# Patient Record
Sex: MALE | Race: WHITE | NOT HISPANIC OR LATINO | Employment: OTHER | URBAN - NONMETROPOLITAN AREA
[De-identification: names, ages, dates, MRNs, and addresses within clinical notes are randomized per-mention and may not be internally consistent; named-entity substitution may affect disease eponyms.]

---

## 2018-04-16 ENCOUNTER — EVALUATION (OUTPATIENT)
Dept: PHYSICAL THERAPY | Facility: CLINIC | Age: 48
End: 2018-04-16
Payer: COMMERCIAL

## 2018-04-16 DIAGNOSIS — M48.02 CERVICAL SPINAL STENOSIS: Primary | ICD-10-CM

## 2018-04-16 PROCEDURE — 97140 MANUAL THERAPY 1/> REGIONS: CPT | Performed by: PHYSICAL THERAPIST

## 2018-04-16 PROCEDURE — G8984 CARRY CURRENT STATUS: HCPCS | Performed by: PHYSICAL THERAPIST

## 2018-04-16 PROCEDURE — 97162 PT EVAL MOD COMPLEX 30 MIN: CPT | Performed by: PHYSICAL THERAPIST

## 2018-04-16 PROCEDURE — G8985 CARRY GOAL STATUS: HCPCS | Performed by: PHYSICAL THERAPIST

## 2018-04-16 PROCEDURE — 97014 ELECTRIC STIMULATION THERAPY: CPT | Performed by: PHYSICAL THERAPIST

## 2018-04-16 NOTE — PROGRESS NOTES
PT Evaluation     Today's date: 2018  Patient name: Reyna Ramey  : 1970  MRN: [id number]  Referring provider: Helga Bhatti MD  Dx:   Encounter Diagnosis     ICD-10-CM    1  Cervical spinal stenosis M48 02        Start Time: 1000  Stop Time: 1100  Total time in clinic (min): 60 minutes    Assessment  Impairments: abnormal or restricted ROM, activity intolerance, impaired physical strength and pain with function    Assessment details: Reyna Ramey is a 49y o  year old male presenting to PT with pain, decreased range of motion, decreased strength, and decreased tolerance to activity  Signs and symptoms are consistent with referring diagnosis of cervical stenosis  Significant cervical disc herniation has been found at C5-6 and 6-7  Degenerative changes result in pain limiting activity and ROM  The existing impairments result in difficulty with driving, sleeping, lifting and carrying  Farzad Dubois is limited in functional mobility that prevent him from carrying out his responsibilities at home and in the community  Reyna Ramey would benefit from skilled PT services to address these issues and to maximize function  Home exercise provided and all questions answered  Thank you for the referral     Understanding of Dx/Px/POC: good   Prognosis: fair  Prognosis details: Patient has been recommended to pursue disc replacement and fusion to address MRI findings  Goals    SHORT TERM GOALS (2-4 WEEKS)    Increase cervical spine AROM 25%   Increase upper extremity strength by 10lbs in affected planes  Improve sitting posture  Sitting tolerance increased to >30 minutes  LONG TERM GOALS (DISCHARGE)          Independent with self correcting seated posture  Able to reach overhead without restriction  Lifting tolerance >15lbs  Able to tolerate housework      Plan  Planned modality interventions: cryotherapy, thermotherapy: hydrocollator packs, unattended electrical stimulation and traction  Planned therapy interventions: manual therapy, self care, therapeutic exercise, therapeutic activities and neuromuscular re-education  Frequency: 3x week  Duration in weeks: 4        Subjective Evaluation    History of Present Illness  Mechanism of injury: Patient reports worsening neck pain over a 5 week period  He reports no falls or trauma that may result in injury  Pain is located in his neck and into the L shouler, with paresthesia into the R hand and upper extremity  He anticipates requiring surgery to address this and does not expect to experience relief from Pt  Quality of life: good    Pain  Current pain ratin  At best pain ratin  At worst pain rating: 10  Location: Neck with L radiculopathy  Quality: burning, dull ache and sharp  Relieving factors: change in position, relaxation and support  Aggravating factors: overhead activity and lifting  Progression: worsening      Diagnostic Tests  Abnormal MRI: herniated disc C5-6, 6-7   degenerative changes  Objective     Active Range of Motion   Cervical/Thoracic Spine   Cervical    Left lateral flexion: Neck active lateral bend left: 25%   Right lateral flexion: Neck active lateral bend right: 50%   Left rotation: Neck active rotation left: 75%   Right rotation: Neck active rotation right: 75%     Additional Active Range of Motion Details  Pain at end range with all cervical AROM           Strength/Myotome Testing     Additional Strength Details  Shoulder flexion  R; 53#  L: 28#   Shoulder abduction  R: 49#  L: 25#   Strength  R: 110#  L: 105#      Flowsheet Rows    Flowsheet Row Most Recent Value   PT/OT G-Codes   Current Score  51   Projected Score  66   FOTO information reviewed  Yes   Assessment Type  Evaluation   G code set  Carrying, Moving & Handling Objects   Carrying, Moving and Handling Objects Current Status ()  CJ   Carrying, Moving and Handling Objects Goal Status ()  CJ          Precautions: C5-7 disc herniation    Daily Treatment Diary        Manual  4-16            STM UB and neck in seated 15'                                                                Exercise Diary              UBE nv            cervical retraction  nv            C EXT AROM nv            C ROT AROM             C SB AROM             C FLEX AROM             Long/short arm extension             Pectoral Stretch               Upper Trap, Levator stretch             LAE, LEXUS             Shoulder flexion             Shoulder abduction                                                                                                        Modalities              MHP/IFC 15'

## 2018-04-16 NOTE — LETTER
2018    ABHI AN    Patient: Diana Dawn   YOB: 1970   Date of Visit: 2018     Encounter Diagnosis     ICD-10-CM    1  Cervical spinal stenosis M48 02        Dear Dr Misael Don:    Please review the attached Plan of Care from [facility] recent visit  Please verify that you agree therapy should continue by signing the attached document and sending it back to our office  If you have any questions or concerns, please don't hesitate to call  Sincerely,    Amparo Huitron, PT      Referring Provider:      I certify that I have read the below Plan of Care and certify the need for these services furnished under this plan of treatment while under my care  Catalina Mcgovern  VIA Facsimile: [phone]          PT Evaluation     Today's date: 2018  Patient name: Diana Dawn  : 1970  MRN: [id number]  Referring provider: Bárbara Bhat MD  Dx:   Encounter Diagnosis     ICD-10-CM    1  Cervical spinal stenosis M48 02        Start Time: 1000  Stop Time: 1100  Total time in clinic (min): 60 minutes    Assessment  Impairments: abnormal or restricted ROM, activity intolerance, impaired physical strength and pain with function    Assessment details: Diana Dawn is a 49y o  year old male presenting to PT with pain, decreased range of motion, decreased strength, and decreased tolerance to activity  Signs and symptoms are consistent with referring diagnosis of cervical stenosis  Significant cervical disc herniation has been found at C5-6 and 6-7  Degenerative changes result in pain limiting activity and ROM  The existing impairments result in difficulty with driving, sleeping, lifting and carrying  Sharalyn Ok is limited in functional mobility that prevent him from carrying out his responsibilities at home and in the community  Diana Dawn would benefit from skilled PT services to address these issues and to maximize function    Home exercise provided and all questions answered  Thank you for the referral     Understanding of Dx/Px/POC: good   Prognosis: fair  Prognosis details: Patient has been recommended to pursue disc replacement and fusion to address MRI findings  Goals    SHORT TERM GOALS (2-4 WEEKS)    Increase cervical spine AROM 25%   Increase upper extremity strength by 10lbs in affected planes  Improve sitting posture  Sitting tolerance increased to >30 minutes  LONG TERM GOALS (DISCHARGE)          Independent with self correcting seated posture  Able to reach overhead without restriction  Lifting tolerance >15lbs  Able to tolerate housework  Plan  Planned modality interventions: cryotherapy, thermotherapy: hydrocollator packs, unattended electrical stimulation and traction  Planned therapy interventions: manual therapy, self care, therapeutic exercise, therapeutic activities and neuromuscular re-education  Frequency: 3x week  Duration in weeks: 4        Subjective Evaluation    History of Present Illness  Mechanism of injury: Patient reports worsening neck pain over a 5 week period  He reports no falls or trauma that may result in injury  Pain is located in his neck and into the L shouler, with paresthesia into the R hand and upper extremity  He anticipates requiring surgery to address this and does not expect to experience relief from Pt  Quality of life: good    Pain  Current pain ratin  At best pain ratin  At worst pain rating: 10  Location: Neck with L radiculopathy  Quality: burning, dull ache and sharp  Relieving factors: change in position, relaxation and support  Aggravating factors: overhead activity and lifting  Progression: worsening      Diagnostic Tests  Abnormal MRI: herniated disc C5-6, 6-7   degenerative changes          Objective     Active Range of Motion   Cervical/Thoracic Spine   Cervical    Left lateral flexion: Neck active lateral bend left: 25%   Right lateral flexion: Neck active lateral bend right: 50%   Left rotation: Neck active rotation left: 75%   Right rotation: Neck active rotation right: 75%     Additional Active Range of Motion Details  Pain at end range with all cervical AROM           Strength/Myotome Testing     Additional Strength Details  Shoulder flexion  R; 53#  L: 28#   Shoulder abduction  R: 49#  L: 25#   Strength  R: 110#  L: 105#      Flowsheet Rows    Flowsheet Row Most Recent Value   PT/OT G-Codes   Current Score  51   Projected Score  66   FOTO information reviewed  Yes   Assessment Type  Evaluation   G code set  Carrying, Moving & Handling Objects   Carrying, Moving and Handling Objects Current Status ()  CJ   Carrying, Moving and Handling Objects Goal Status ()  CJ          Precautions: C5-7 disc herniation    Daily Treatment Diary        Manual  4-16            STM UB and neck in seated 15'                                                                Exercise Diary              UBE nv            cervical retraction  nv            C EXT AROM nv            C ROT AROM             C SB AROM             C FLEX AROM             Long/short arm extension             Pectoral Stretch               Upper Trap, Levator stretch             LAE, LEXUS             Shoulder flexion             Shoulder abduction                                                                                                        Modalities              MHP/IFC 15'

## 2018-04-19 ENCOUNTER — OFFICE VISIT (OUTPATIENT)
Dept: PHYSICAL THERAPY | Facility: CLINIC | Age: 48
End: 2018-04-19
Payer: COMMERCIAL

## 2018-04-19 DIAGNOSIS — M48.02 CERVICAL SPINAL STENOSIS: Primary | ICD-10-CM

## 2018-04-19 PROCEDURE — 97110 THERAPEUTIC EXERCISES: CPT | Performed by: PHYSICAL THERAPIST

## 2018-04-19 PROCEDURE — 97014 ELECTRIC STIMULATION THERAPY: CPT | Performed by: PHYSICAL THERAPIST

## 2018-04-19 PROCEDURE — 97535 SELF CARE MNGMENT TRAINING: CPT | Performed by: PHYSICAL THERAPIST

## 2018-04-19 PROCEDURE — 97140 MANUAL THERAPY 1/> REGIONS: CPT | Performed by: PHYSICAL THERAPIST

## 2018-04-19 NOTE — PROGRESS NOTES
Today's date: 2018  Patient name: Chelle Hawk  : 1970  MRN: [id number]  Referring provider: Belkis Nicholson MD  Dx:   Encounter Diagnosis     ICD-10-CM    1  Cervical spinal stenosis M48 02                   Subjective: Tony Jackson reports that his shoulder and upper back felt relief for 2-3 hours following last visit before his pain symptoms returned  He did some chores in his garage during that time and is unsure if that contributed to return of symptoms  He plans to relax after his visit today to determine if rest allows relief to last longer  Objective: See treatment diary below      Assessment: Tolerated treatment well and was able to tolerate addition of AROM  Marcine Grow Patient would benefit from continued PT and reports temporary relief from symptoms following PT  Plan: Progress treatment as tolerated         Precautions: C5-7 disc herniation     Daily Treatment Diary           Manual  4-16  4-19                   STM UB and neck in seated 15'  15'                                                                                                                   Exercise Diary                        UBE nv  3/3                   cervical retraction  nv  5"x15                   C EXT AROM nv                     C ROT AROM    5"x15                   C SB AROM                       C FLEX AROM    5"x15                   Long/short arm extension    l2 x15 ea                   Pectoral Stretch                        Shoulder flexion                       Shoulder abduction                        L UT stretch   30"x3                    L levator stretch    30"x3                                                                                                                                           Modalities                        MHP/IFC 13'  15'

## 2018-04-23 ENCOUNTER — OFFICE VISIT (OUTPATIENT)
Dept: PHYSICAL THERAPY | Facility: CLINIC | Age: 48
End: 2018-04-23
Payer: COMMERCIAL

## 2018-04-23 DIAGNOSIS — M48.02 CERVICAL SPINAL STENOSIS: Primary | ICD-10-CM

## 2018-04-23 PROCEDURE — 97010 HOT OR COLD PACKS THERAPY: CPT | Performed by: PHYSICAL THERAPIST

## 2018-04-23 PROCEDURE — 97014 ELECTRIC STIMULATION THERAPY: CPT | Performed by: PHYSICAL THERAPIST

## 2018-04-23 PROCEDURE — 97110 THERAPEUTIC EXERCISES: CPT | Performed by: PHYSICAL THERAPIST

## 2018-04-23 PROCEDURE — 97140 MANUAL THERAPY 1/> REGIONS: CPT | Performed by: PHYSICAL THERAPIST

## 2018-04-23 NOTE — PROGRESS NOTES
Today's date: 2018  Patient name: Karen Carolina  : 1970  MRN: [id number]  Referring provider: Jeff Mishra MD  Dx:   Encounter Diagnosis     ICD-10-CM    1  Cervical spinal stenosis M48 02                   Subjective: Mohinder Carson reports that his neck has been very painful all weekend, resulting in extreme difficulty sleeping  He experienced approximately 3 hours of relief following last PT session  Objective: See treatment diary below      Assessment: Tolerated treatment fair and was limited by pain at end range for all movements    Patient would benefit from continued PT      Plan: Continue per plan of care        Precautions: C5-7 disc herniation     Daily Treatment Diary           Manual  -16  -  4-23                 STM UB and neck in seated 15'  15'  15'                                                                                                                 Exercise Diary                        UBE nv  3/3  4F/1B                 cervical retraction  nv  1"U10  7"N96                 C EXT AROM nv                     C ROT AROM    5"x15  5"x15                 C SB AROM                       C FLEX AROM    5"x15  5"x15                 Long/short arm extension    l2 x15 ea  l2 x15                 Pectoral Stretch       20"x3                 Shoulder flexion                       Shoulder abduction                        L UT stretch   30"x3  30"x3                  L levator stretch    30"x3                                                                                                                                           Modalities                        MHP/IFC 13'  15'                    MHP -pre tx     10'                  IFC - post tx      15'

## 2018-04-27 ENCOUNTER — OFFICE VISIT (OUTPATIENT)
Dept: PHYSICAL THERAPY | Facility: CLINIC | Age: 48
End: 2018-04-27
Payer: COMMERCIAL

## 2018-04-27 DIAGNOSIS — M48.02 CERVICAL SPINAL STENOSIS: Primary | ICD-10-CM

## 2018-04-27 PROCEDURE — 97014 ELECTRIC STIMULATION THERAPY: CPT | Performed by: PHYSICAL THERAPIST

## 2018-04-27 PROCEDURE — 97010 HOT OR COLD PACKS THERAPY: CPT | Performed by: PHYSICAL THERAPIST

## 2018-04-27 PROCEDURE — 97140 MANUAL THERAPY 1/> REGIONS: CPT | Performed by: PHYSICAL THERAPIST

## 2018-04-27 PROCEDURE — 97110 THERAPEUTIC EXERCISES: CPT | Performed by: PHYSICAL THERAPIST

## 2018-04-27 PROCEDURE — 97535 SELF CARE MNGMENT TRAINING: CPT | Performed by: PHYSICAL THERAPIST

## 2018-04-30 ENCOUNTER — OFFICE VISIT (OUTPATIENT)
Dept: PHYSICAL THERAPY | Facility: CLINIC | Age: 48
End: 2018-04-30
Payer: COMMERCIAL

## 2018-04-30 DIAGNOSIS — M48.02 CERVICAL SPINAL STENOSIS: Primary | ICD-10-CM

## 2018-04-30 PROCEDURE — 97110 THERAPEUTIC EXERCISES: CPT | Performed by: PHYSICAL THERAPIST

## 2018-04-30 PROCEDURE — 97535 SELF CARE MNGMENT TRAINING: CPT | Performed by: PHYSICAL THERAPIST

## 2018-04-30 PROCEDURE — 97014 ELECTRIC STIMULATION THERAPY: CPT | Performed by: PHYSICAL THERAPIST

## 2018-04-30 PROCEDURE — 97140 MANUAL THERAPY 1/> REGIONS: CPT | Performed by: PHYSICAL THERAPIST

## 2018-04-30 PROCEDURE — 97010 HOT OR COLD PACKS THERAPY: CPT | Performed by: PHYSICAL THERAPIST

## 2018-04-30 NOTE — PROGRESS NOTES
Today's date: 2018  Patient name: Narvis Schaumann  : 1970  MRN: [id number]  Referring provider: Pia Bergeron MD  Dx:   Encounter Diagnosis     ICD-10-CM    1  Cervical spinal stenosis M48 02                   Subjective: Dawson Weinberg reports that his neck symptoms persist   No significant changes since [facility]  Objective: See treatment diary below      Assessment: Tolerated treatment fair and was unable to progress program due to continued elevated pain symptoms  He reports relief of pain following STM and scapular mobilization    Patient would benefit from continued PT      Plan: Progress treatment as tolerated  Plan to re-assess at the end of this week to determine overall benefit from PT        Precautions: C5-7 disc herniation     Daily Treatment Diary           Manual  4-16  4-  4-  4  430             STM UB and neck in seated 15'  15'  15'  10'  15                                                                                                               Exercise Diary                        UBE nv  3/3  4F/1B  5F/3B  4/4             cervical retraction  nv  5"x15  5"x15  5"x15  15x5"             C EXT AROM nv                     C ROT AROM    5"x15  5"x15                 C SB AROM                       C FLEX AROM    5"x15  5"x15                 Long/short arm extension    l2 x15 ea  l2 x15  L3 2x10  L5 2x10             Pectoral Stretch       20"x3                 Shoulder flexion                       Shoulder abduction                        L UT stretch   30"x3  30"x3  30"x3  30"x3              L levator stretch    30"x3                                                                                                                                           Modalities                        MHP/IFC 13'  15'                    MHP -pre tx     8'  10'  10'              IFC - post tx      15'  15'  15'

## 2018-05-03 ENCOUNTER — OFFICE VISIT (OUTPATIENT)
Dept: PHYSICAL THERAPY | Facility: CLINIC | Age: 48
End: 2018-05-03
Payer: COMMERCIAL

## 2018-05-03 DIAGNOSIS — M48.02 CERVICAL SPINAL STENOSIS: Primary | ICD-10-CM

## 2018-05-03 PROCEDURE — 97014 ELECTRIC STIMULATION THERAPY: CPT | Performed by: PHYSICAL THERAPIST

## 2018-05-03 PROCEDURE — 97140 MANUAL THERAPY 1/> REGIONS: CPT | Performed by: PHYSICAL THERAPIST

## 2018-05-03 PROCEDURE — 97110 THERAPEUTIC EXERCISES: CPT | Performed by: PHYSICAL THERAPIST

## 2018-05-03 NOTE — PROGRESS NOTES
Today's date: 5/3/2018  Patient name: Chalo Pandey  : 1970  MRN: [id number]  Referring provider: Elizabeth Tompkins MD  Dx:   Encounter Diagnosis     ICD-10-CM    1  Cervical spinal stenosis M48 02                   Subjective: Arianna Jeffers reports that his neck is painful today following several hours of power washing yesterday  Objective: See treatment diary below      Assessment: Tolerated treatment fair and was able to tolerate addition of several UE strengthening exercises today    Patient exhibited good technique with therapeutic exercises and declined additional exercises due to increase in pain towards end of session  Plan: Continue per plan of care        Precautions: C5-7 disc herniation     Daily Treatment Diary           Manual  4-16  4-19  4-23  4-27  4-30  5-3           STM UB and neck in seated 15'  15'  15'  10'  15     15'                                   Exercise Diary                        UBE nv  3/3  4F/1B  5F/3B  4/4  4/4           cervical retraction  nv  5"x15  5"x15  5"x15  15x5"  NV           C EXT AROM nv                     C ROT AROM    5"x15  5"x15                 TB ER/IR            L3 L3         C FLEX AROM    5"x15  5"x15                 Long/short arm extension    l2 x15 ea  l2 x15  L3 2x10  L5 2x10  L5 2x10           Pectoral Stretch       20"x3                 Shoulder flexion                       Shoulder abduction                        L UT stretch   30"x3  30"x3  30"x3  30"x3              L levator stretch    30"x3                    TB biceps           L5 2x10            TB Triceps           L5 2x10            TB UT pull           L5 2x10                                                           Modalities                        MHP/IFC 13'  15'        15            MHP -pre tx     10'  10'  10'              IFC - post tx      15'  15'  15'

## 2018-05-04 ENCOUNTER — OFFICE VISIT (OUTPATIENT)
Dept: PHYSICAL THERAPY | Facility: CLINIC | Age: 48
End: 2018-05-04
Payer: COMMERCIAL

## 2018-05-04 DIAGNOSIS — M48.02 CERVICAL SPINAL STENOSIS: Primary | ICD-10-CM

## 2018-05-04 PROCEDURE — 97140 MANUAL THERAPY 1/> REGIONS: CPT | Performed by: PHYSICAL THERAPIST

## 2018-05-04 PROCEDURE — 97110 THERAPEUTIC EXERCISES: CPT | Performed by: PHYSICAL THERAPIST

## 2018-05-04 PROCEDURE — 97014 ELECTRIC STIMULATION THERAPY: CPT | Performed by: PHYSICAL THERAPIST

## 2018-05-04 NOTE — PROGRESS NOTES
Today's date: 2018  Patient name: Reyna Ramey  : 1970  MRN: [id number]  Referring provider: Helga Bhatti MD  Dx:   Encounter Diagnosis     ICD-10-CM    1  Cervical spinal stenosis M48 02                   Subjective: Farzad Dubois reports that his neck is sore today  He tried to "take it easy" yesterday, but still woke with elevated pain levels  Objective: See treatment diary below      Assessment: Tolerated treatment fair and was able to perform full program but was unable to tolerate any progression today    Patient continues to experience short term relief  following PT  His only lasting improvement has been decreased paresthesias in his L hand  Plan: Continue per plan of care  plan RE to progress patient to injection treatment        Precautions: C5-7 disc herniation     Daily Treatment Diary           Manual  4-16  4-19  4-23  4-27  4-30  5-3  5-4   STM UB and neck in seated 15'  15'  15'  10'  15     15  15'                     Exercise Diary                  UBE nv  3/3  4F/1B  5F/3B  4/4  4/4  4/4   cervical retraction  nv  5"x15  5"x15  5"x15  15x5"  NV     C EXT AROM nv               C ROT AROM    5"x15  5"x15           TB ER/IR            L3 NV  L3   C FLEX AROM    5"x15  5"x15           Long/short arm extension    l2 x15 ea  l2 x15  L3 2x10  L5 2x10  L5 2x10  L5 2x10   Pectoral Stretch       20"x3        30"x3   Shoulder flexion                 Shoulder abduction                  L UT stretch   30"x3  30"x3  30"x3  30"x3    30# x3    L levator stretch    30"x3              TB biceps           L5 2x10  L5 2x10    TB Triceps           L5 2x10  L5 2x10    TB UT pull           L5 2x10  L5 2x10                                       Modalities                  MHP/IFC '  15'        15'  15'    MHP -pre tx     10'  10'  10'       [facility] - post tx      15'  15'  15'

## 2018-05-08 ENCOUNTER — OFFICE VISIT (OUTPATIENT)
Dept: PHYSICAL THERAPY | Facility: CLINIC | Age: 48
End: 2018-05-08
Payer: COMMERCIAL

## 2018-05-08 DIAGNOSIS — M48.02 CERVICAL SPINAL STENOSIS: Primary | ICD-10-CM

## 2018-05-08 PROCEDURE — G8985 CARRY GOAL STATUS: HCPCS | Performed by: PHYSICAL THERAPIST

## 2018-05-08 PROCEDURE — 97014 ELECTRIC STIMULATION THERAPY: CPT

## 2018-05-08 PROCEDURE — 97110 THERAPEUTIC EXERCISES: CPT

## 2018-05-08 PROCEDURE — 97140 MANUAL THERAPY 1/> REGIONS: CPT

## 2018-05-08 PROCEDURE — G8984 CARRY CURRENT STATUS: HCPCS | Performed by: PHYSICAL THERAPIST

## 2018-05-08 NOTE — PROGRESS NOTES
PT Re-Evaluation     Today's date: 2018  Patient name: Che Larose  : 1970  MRN: [id number]  Referring provider: Micheline Espinoza MD  Dx:   Encounter Diagnosis     ICD-10-CM    1  Cervical spinal stenosis M48 02      Abigail Rodriguez will be discharged from outpatient physical therapy care due to expiration of plan of care  No objective measures updated, Abigail Rodriguez is not present at time of discharge  Assessment  Impairments: abnormal or restricted ROM, activity intolerance, impaired physical strength and pain with function    Assessment details: Che Larose is a 49y o  year old male presenting to PT with pain, decreased range of motion, decreased strength, and decreased tolerance to activity  Signs and symptoms are consistent with referring diagnosis of cervical stenosis  Significant cervical disc herniation has been found at C5-6 and 6-7  Degenerative changes result in pain limiting activity and ROM  At this time Abigail Rodriguez has not been responding to isolated physical therapy intervention  He would benefit from integrated care including additional imaging, injections if deemed appropriate by his physicians  Understanding of Dx/Px/POC: good   Prognosis: fair  Prognosis details: Patient has been recommended to pursue disc replacement and fusion to address MRI findings  Goals    SHORT TERM GOALS (2-4 WEEKS)    Increase cervical spine AROM 25% - partially met  Increase upper extremity strength by 10lbs in affected planes - met  Improve sitting posture - met  Sitting tolerance increased to >30 minutes  - met          LONG TERM GOALS (DISCHARGE)          Independent with self correcting seated posture - met  Able to reach overhead without restriction - partially met  Lifting tolerance >15lbs  - not met  Able to tolerate housework       Plan  Planned modality interventions: cryotherapy, thermotherapy: hydrocollator packs, unattended electrical stimulation and traction  Planned therapy interventions: manual therapy, self care, therapeutic exercise, therapeutic activities and neuromuscular re-education  Frequency: 3x week  Duration in weeks: 4        Subjective Evaluation    History of Present Illness  Norman Dunne has been compliant with attending PT and home exercise program since initial eval and reports <20%% improvement since start of care  His symptoms improve for 2-3 hours following PT but then return  Ning Lexis reports and demonstrates minimal change in function  since initial evalRalph continues with above listed impairments and would benefit from additional skilled PT to address these deficits to return to prior level of function  Pain  Current pain ratin  At best pain ratin  At worst pain ratin  Location: Neck with L radiculopathy  Quality: burning, dull ache and sharp  Relieving factors: change in position, relaxation and support  Aggravating factors: overhead activity and lifting  Progression: worsening      Diagnostic Tests  Abnormal MRI: herniated disc C5-6, 6-7   degenerative changes  Objective     Active Range of Motion   Cervical/Thoracic Spine   Cervical    Left lateral flexion: Neck active lateral bend left: <50% right: 50-75%  Right lateral flexion: Neck active lateral bend right: 50%   Left rotation: Neck active rotation left: 75%   Right rotation: Neck active rotation right: 75%     Additional Active Range of Motion Details  Pain at end range with all cervical AROM  Strength/Myotome Testing     Additional Strength Details  Shoulder flexion  R; 53#  L: 42#   Shoulder abduction  R: 49#  L: 45#   Strength  R: 110#  L: 105#                    Daily Note     Today's date: 2018  Patient name: Norman Dunne  : 1970  MRN: [id number]  Referring provider: Nohemy Landon MD  Dx:   Encounter Diagnosis     ICD-10-CM    1  Cervical spinal stenosis M48 02                   Subjective: Patient reports that his pain is still the same   He feels good when he leaves therapy for about two hours and then his pain starts increasing again  He will receive injections after 10 therapy visits  Objective: See treatment diary below  Incorporated TB IR/ER today  Assessment: Tolerated treatment fair  Patient had no change in radicular symptoms today  Patient will benefit from further pain management and testing  Plan: Continue per plan of care       Precautions: C5-7 disc herniation     Daily Treatment Diary           Manual  5-8  4-19  4-23  4-27  4-30  5-3  5-4   STM UB and neck in seated 10'  15'  15'  10'  15     15'  15'                     Exercise Diary                  UBE 4/4  3/3  4F/1B  5F/3B  4/4  4/4  4/4   cervical retraction    5"x15  5"x15  5"x15  15x5"  NV     C EXT AROM                C ROT AROM   5"x15  5"x15           TB ER/IR L3 2x10          L3 NV  L3   C FLEX AROM   5"x15  5"x15           Long/short arm extension L5 2x10  l2 x15 ea  l2 x15  L3 2x10  L5 2x10  L5 2x10  L5 2x10   Pectoral Stretch      20"x3        30"x3   Shoulder flexion                Shoulder abduction                 L UT stretch  30"x3  30"x3  30"x3  30"x3    30# x3    L levator stretch  30"x3              TB biceps L 5 2x10         L5 2x10  L5 2x10    TB Triceps L 5 2x10         L5 2x10  L5 2x10    TB UT pull L 5 2x10         L5 2x10  L5 2x10                                     Modalities                 MHP/IFC 13'  15'        15'  15'    MHP -pre tx    10'  10'  10'       [facility] - post tx     15'  15'  15'

## 2018-05-08 NOTE — LETTER
May 8, 2018    JUVEMIKNANETTE    Patient: Priscilla Valentine   YOB: 1970   Date of Visit: 2018     Encounter Diagnosis     ICD-10-CM    1  Cervical spinal stenosis M48 02        Dear Dr Cynthia Felipe:    Please review the attached Plan of Care from [facility] recent visit  Please verify that you agree therapy should continue by signing the attached document and sending it back to our office  If you have any questions or concerns, please don't hesitate to call  Sincerely,    Monica Kiser, PT      Referring Provider:      I certify that I have read the below Plan of Care and certify the need for these services furnished under this plan of treatment while under my care  Angie Trinh  VIA Facsimile: [phone]          PT Re-Evaluation     Today's date: 2018  Patient name: Priscilla Valentine  : 1970  MRN: [id number]  Referring provider: Jens Pierson MD  Dx:   Encounter Diagnosis     ICD-10-CM    1  Cervical spinal stenosis M48 02                   Assessment  Impairments: abnormal or restricted ROM, activity intolerance, impaired physical strength and pain with function    Assessment details: Priscilla Valentine is a 49y o  year old male presenting to PT with pain, decreased range of motion, decreased strength, and decreased tolerance to activity  Signs and symptoms are consistent with referring diagnosis of cervical stenosis  Significant cervical disc herniation has been found at C5-6 and 6-7  Degenerative changes result in pain limiting activity and ROM  At this time Lino Constantino has not been responding to isolated physical therapy intervention  He would benefit from integrated care including additional imaging, injections if deemed appropriate by his physicians  Understanding of Dx/Px/POC: good   Prognosis: fair  Prognosis details: Patient has been recommended to pursue disc replacement and fusion to address MRI findings       Goals    SHORT TERM GOALS (2-4 WEEKS)    Increase cervical spine AROM 25% - partially met  Increase upper extremity strength by 10lbs in affected planes - met  Improve sitting posture - met  Sitting tolerance increased to >30 minutes  - met          LONG TERM GOALS (DISCHARGE)          Independent with self correcting seated posture - met  Able to reach overhead without restriction - partially met  Lifting tolerance >15lbs  - not met  Able to tolerate housework  Plan  Planned modality interventions: cryotherapy, thermotherapy: hydrocollator packs, unattended electrical stimulation and traction  Planned therapy interventions: manual therapy, self care, therapeutic exercise, therapeutic activities and neuromuscular re-education  Frequency: 3x week  Duration in weeks: 4        Subjective Evaluation    History of Present Illness  Norman Dunne has been compliant with attending PT and home exercise program since initial eval and reports <20%% improvement since start of care  His symptoms improve for 2-3 hours following PT but then return  Ninglevi Pires reports and demonstrates minimal change in function  since initial evalRalph continues with above listed impairments and would benefit from additional skilled PT to address these deficits to return to prior level of function  Pain  Current pain ratin  At best pain ratin  At worst pain ratin  Location: Neck with L radiculopathy  Quality: burning, dull ache and sharp  Relieving factors: change in position, relaxation and support  Aggravating factors: overhead activity and lifting  Progression: worsening      Diagnostic Tests  Abnormal MRI: herniated disc C5-6, 6-7   degenerative changes          Objective     Active Range of Motion   Cervical/Thoracic Spine   Cervical    Left lateral flexion: Neck active lateral bend left: <50% right: 50-75%  Right lateral flexion: Neck active lateral bend right: 50%   Left rotation: Neck active rotation left: 75%   Right rotation: Neck active rotation right: 75%     Additional Active Range of Motion Details  Pain at end range with all cervical AROM  Strength/Myotome Testing     Additional Strength Details  Shoulder flexion  R; 53#  L: 42#   Shoulder abduction  R: 49#  L: 45#   Strength  R: 110#  L: 105#                    Daily Note     Today's date: 2018  Patient name: Mele Ortiz  : 1970  MRN: [id number]  Referring provider: Radha Hooper MD  Dx:   Encounter Diagnosis     ICD-10-CM    1  Cervical spinal stenosis M48 02                   Subjective: Patient reports that his pain is still the same  He feels good when he leaves therapy for about two hours and then his pain starts increasing again  He will receive injections after 10 therapy visits  Objective: See treatment diary below  Incorporated TB IR/ER today  Assessment: Tolerated treatment fair  Patient had no change in radicular symptoms today  Patient will benefit from further pain management and testing  Plan: Continue per plan of care       Precautions: C5-7 disc herniation     Daily Treatment Diary           Manual  5-8  4-19  4-23  4-27  4-30  5-3  5-4   STM UB and neck in seated 10'  15'  15'  10'  15     15'  15'                     Exercise Diary                  UBE 4/4  3/3  4F/1B  5F/3B  4/4  4/4  4/4   cervical retraction    5"x15  5"x15  5"x15  15x5"  NV     C EXT AROM                C ROT AROM   5"x15  5"x15           TB ER/IR L3 2x10          L3 NV  L3   C FLEX AROM   5"x15  5"x15           Long/short arm extension L5 2x10  l2 x15 ea  l2 x15  L3 2x10  L5 2x10  L5 2x10  L5 2x10   Pectoral Stretch      20"x3        30"x3   Shoulder flexion                Shoulder abduction                 L UT stretch  30"x3  30"x3  30"x3  30"x3    30# x3    L levator stretch  30"x3              TB biceps L 5 2x10         L5 2x10  L5 2x10    TB Triceps L 5 2x10         L5 2x10  L5 2x10    TB UT pull L 5 2x10         L5 2x10  L5 2x10                                     Modalities                 MHP/IFC 13'  15'        15'  15'    MHP -pre tx    10'  10'  10'       [facility] - post tx     15'  15'  15'

## 2018-05-11 ENCOUNTER — APPOINTMENT (OUTPATIENT)
Dept: PHYSICAL THERAPY | Facility: CLINIC | Age: 48
End: 2018-05-11
Payer: COMMERCIAL

## 2024-07-30 ENCOUNTER — APPOINTMENT (EMERGENCY)
Dept: RADIOLOGY | Facility: HOSPITAL | Age: 54
End: 2024-07-30
Payer: COMMERCIAL

## 2024-07-30 ENCOUNTER — HOSPITAL ENCOUNTER (EMERGENCY)
Facility: HOSPITAL | Age: 54
Discharge: HOME | End: 2024-07-30
Attending: EMERGENCY MEDICINE
Payer: COMMERCIAL

## 2024-07-30 ENCOUNTER — APPOINTMENT (EMERGENCY)
Dept: CT IMAGING | Facility: HOSPITAL | Age: 54
End: 2024-07-30
Payer: COMMERCIAL

## 2024-07-30 VITALS
DIASTOLIC BLOOD PRESSURE: 56 MMHG | SYSTOLIC BLOOD PRESSURE: 114 MMHG | HEART RATE: 58 BPM | WEIGHT: 224.21 LBS | OXYGEN SATURATION: 97 % | RESPIRATION RATE: 20 BRPM | TEMPERATURE: 97.4 F

## 2024-07-30 DIAGNOSIS — I95.1 ORTHOSTATIC SYNCOPE: Primary | ICD-10-CM

## 2024-07-30 DIAGNOSIS — R00.1 JUNCTIONAL BRADYCARDIA: ICD-10-CM

## 2024-07-30 DIAGNOSIS — E87.1 HYPONATREMIA: ICD-10-CM

## 2024-07-30 LAB
ANION GAP SERPL CALCULATED.3IONS-SCNC: 12 MMOL/L (ref 4–13)
BASE EX.OXY STD BLDV CALC-SCNC: 59.3 % (ref 60–80)
BASE EXCESS BLDV CALC-SCNC: -7.8 MMOL/L
BASOPHILS # BLD AUTO: 0.04 THOUSANDS/ÂΜL (ref 0–0.1)
BASOPHILS NFR BLD AUTO: 1 % (ref 0–1)
BILIRUB UR QL STRIP: NEGATIVE
BNP SERPL-MCNC: 93 PG/ML (ref 0–100)
BUN SERPL-MCNC: 14 MG/DL (ref 5–25)
CALCIUM SERPL-MCNC: 9.2 MG/DL (ref 8.4–10.2)
CARDIAC TROPONIN I PNL SERPL HS: 4 NG/L
CHLORIDE SERPL-SCNC: 99 MMOL/L (ref 96–108)
CK SERPL-CCNC: 91 U/L (ref 39–308)
CLARITY UR: CLEAR
CO2 SERPL-SCNC: 20 MMOL/L (ref 21–32)
COLOR UR: NORMAL
CREAT SERPL-MCNC: 1.11 MG/DL (ref 0.6–1.3)
EOSINOPHIL # BLD AUTO: 0.52 THOUSAND/ÂΜL (ref 0–0.61)
EOSINOPHIL NFR BLD AUTO: 6 % (ref 0–6)
ERYTHROCYTE [DISTWIDTH] IN BLOOD BY AUTOMATED COUNT: 12.9 % (ref 11.6–15.1)
ETHANOL SERPL-MCNC: 133 MG/DL
GFR SERPL CREATININE-BSD FRML MDRD: 74 ML/MIN/1.73SQ M
GLUCOSE SERPL-MCNC: 113 MG/DL (ref 65–140)
GLUCOSE UR STRIP-MCNC: NEGATIVE MG/DL
HCO3 BLDV-SCNC: 17.9 MMOL/L (ref 24–30)
HCT VFR BLD AUTO: 30.2 % (ref 36.5–49.3)
HGB BLD-MCNC: 9.6 G/DL (ref 12–17)
HGB UR QL STRIP.AUTO: NEGATIVE
IMM GRANULOCYTES # BLD AUTO: 0.03 THOUSAND/UL (ref 0–0.2)
IMM GRANULOCYTES NFR BLD AUTO: 0 % (ref 0–2)
INR PPP: 1.11 (ref 0.84–1.19)
KETONES UR STRIP-MCNC: NEGATIVE MG/DL
LACTATE SERPL-SCNC: 2.1 MMOL/L (ref 0.5–2)
LEUKOCYTE ESTERASE UR QL STRIP: NEGATIVE
LYMPHOCYTES # BLD AUTO: 2.25 THOUSANDS/ÂΜL (ref 0.6–4.47)
LYMPHOCYTES NFR BLD AUTO: 26 % (ref 14–44)
MCH RBC QN AUTO: 30.2 PG (ref 26.8–34.3)
MCHC RBC AUTO-ENTMCNC: 31.8 G/DL (ref 31.4–37.4)
MCV RBC AUTO: 95 FL (ref 82–98)
MONOCYTES # BLD AUTO: 0.66 THOUSAND/ÂΜL (ref 0.17–1.22)
MONOCYTES NFR BLD AUTO: 8 % (ref 4–12)
NEUTROPHILS # BLD AUTO: 5.17 THOUSANDS/ÂΜL (ref 1.85–7.62)
NEUTS SEG NFR BLD AUTO: 59 % (ref 43–75)
NITRITE UR QL STRIP: NEGATIVE
NRBC BLD AUTO-RTO: 0 /100 WBCS
O2 CT BLDV-SCNC: 9 ML/DL
PCO2 BLDV: 36.7 MM HG (ref 42–50)
PH BLDV: 7.3 [PH] (ref 7.3–7.4)
PH UR STRIP.AUTO: 5.5 [PH]
PLATELET # BLD AUTO: 337 THOUSANDS/UL (ref 149–390)
PMV BLD AUTO: 8.9 FL (ref 8.9–12.7)
PO2 BLDV: 36.6 MM HG (ref 35–45)
POTASSIUM SERPL-SCNC: 3.9 MMOL/L (ref 3.5–5.3)
PROT UR STRIP-MCNC: NEGATIVE MG/DL
PROTHROMBIN TIME: 14.2 SECONDS (ref 11.6–14.5)
RBC # BLD AUTO: 3.18 MILLION/UL (ref 3.88–5.62)
SODIUM SERPL-SCNC: 131 MMOL/L (ref 135–147)
SP GR UR STRIP.AUTO: 1.01 (ref 1–1.03)
T4 FREE SERPL-MCNC: 0.67 NG/DL (ref 0.61–1.12)
TSH SERPL DL<=0.05 MIU/L-ACNC: 3.36 UIU/ML (ref 0.45–4.5)
UROBILINOGEN UR STRIP-ACNC: <2 MG/DL
WBC # BLD AUTO: 8.67 THOUSAND/UL (ref 4.31–10.16)

## 2024-07-30 PROCEDURE — 84484 ASSAY OF TROPONIN QUANT: CPT | Performed by: EMERGENCY MEDICINE

## 2024-07-30 PROCEDURE — 93308 TTE F-UP OR LMTD: CPT | Performed by: EMERGENCY MEDICINE

## 2024-07-30 PROCEDURE — 82550 ASSAY OF CK (CPK): CPT | Performed by: EMERGENCY MEDICINE

## 2024-07-30 PROCEDURE — 70450 CT HEAD/BRAIN W/O DYE: CPT

## 2024-07-30 PROCEDURE — 93005 ELECTROCARDIOGRAM TRACING: CPT

## 2024-07-30 PROCEDURE — 96375 TX/PRO/DX INJ NEW DRUG ADDON: CPT

## 2024-07-30 PROCEDURE — 84443 ASSAY THYROID STIM HORMONE: CPT | Performed by: EMERGENCY MEDICINE

## 2024-07-30 PROCEDURE — 71045 X-RAY EXAM CHEST 1 VIEW: CPT

## 2024-07-30 PROCEDURE — 74177 CT ABD & PELVIS W/CONTRAST: CPT

## 2024-07-30 PROCEDURE — 99285 EMERGENCY DEPT VISIT HI MDM: CPT

## 2024-07-30 PROCEDURE — 81003 URINALYSIS AUTO W/O SCOPE: CPT | Performed by: EMERGENCY MEDICINE

## 2024-07-30 PROCEDURE — 83880 ASSAY OF NATRIURETIC PEPTIDE: CPT | Performed by: EMERGENCY MEDICINE

## 2024-07-30 PROCEDURE — 96361 HYDRATE IV INFUSION ADD-ON: CPT

## 2024-07-30 PROCEDURE — 82077 ASSAY SPEC XCP UR&BREATH IA: CPT | Performed by: EMERGENCY MEDICINE

## 2024-07-30 PROCEDURE — 36415 COLL VENOUS BLD VENIPUNCTURE: CPT | Performed by: EMERGENCY MEDICINE

## 2024-07-30 PROCEDURE — 71260 CT THORAX DX C+: CPT

## 2024-07-30 PROCEDURE — 80048 BASIC METABOLIC PNL TOTAL CA: CPT | Performed by: EMERGENCY MEDICINE

## 2024-07-30 PROCEDURE — 99285 EMERGENCY DEPT VISIT HI MDM: CPT | Performed by: EMERGENCY MEDICINE

## 2024-07-30 PROCEDURE — 76705 ECHO EXAM OF ABDOMEN: CPT | Performed by: EMERGENCY MEDICINE

## 2024-07-30 PROCEDURE — 83605 ASSAY OF LACTIC ACID: CPT | Performed by: EMERGENCY MEDICINE

## 2024-07-30 PROCEDURE — 84439 ASSAY OF FREE THYROXINE: CPT | Performed by: EMERGENCY MEDICINE

## 2024-07-30 PROCEDURE — 85025 COMPLETE CBC W/AUTO DIFF WBC: CPT | Performed by: EMERGENCY MEDICINE

## 2024-07-30 PROCEDURE — 96365 THER/PROPH/DIAG IV INF INIT: CPT

## 2024-07-30 PROCEDURE — 85610 PROTHROMBIN TIME: CPT | Performed by: EMERGENCY MEDICINE

## 2024-07-30 PROCEDURE — 72125 CT NECK SPINE W/O DYE: CPT

## 2024-07-30 PROCEDURE — 82805 BLOOD GASES W/O2 SATURATION: CPT | Performed by: EMERGENCY MEDICINE

## 2024-07-30 RX ORDER — SODIUM CHLORIDE, SODIUM GLUCONATE, SODIUM ACETATE, POTASSIUM CHLORIDE, MAGNESIUM CHLORIDE, SODIUM PHOSPHATE, DIBASIC, AND POTASSIUM PHOSPHATE .53; .5; .37; .037; .03; .012; .00082 G/100ML; G/100ML; G/100ML; G/100ML; G/100ML; G/100ML; G/100ML
1000 INJECTION, SOLUTION INTRAVENOUS ONCE
Status: COMPLETED | OUTPATIENT
Start: 2024-07-30 | End: 2024-07-30

## 2024-07-30 RX ORDER — ATROPINE SULFATE 0.1 MG/ML
0.5 INJECTION INTRAVENOUS ONCE
Status: COMPLETED | OUTPATIENT
Start: 2024-07-30 | End: 2024-07-30

## 2024-07-30 RX ADMIN — IOHEXOL 100 ML: 350 INJECTION, SOLUTION INTRAVENOUS at 12:11

## 2024-07-30 RX ADMIN — SODIUM CHLORIDE 1000 ML: 0.9 INJECTION, SOLUTION INTRAVENOUS at 11:27

## 2024-07-30 RX ADMIN — SODIUM CHLORIDE, SODIUM GLUCONATE, SODIUM ACETATE, POTASSIUM CHLORIDE, MAGNESIUM CHLORIDE, SODIUM PHOSPHATE, DIBASIC, AND POTASSIUM PHOSPHATE 1000 ML: .53; .5; .37; .037; .03; .012; .00082 INJECTION, SOLUTION INTRAVENOUS at 12:43

## 2024-07-30 RX ADMIN — ATROPINE SULFATE 0.5 MG: 0.1 INJECTION INTRAVENOUS at 11:35

## 2024-07-30 NOTE — ED PROVIDER NOTES
Emergency Department Trauma Note  Nba Jimenez 54 y.o. male MRN: [id number]  Unit/Bed#: RM03/RM03 Encounter: [id number]      Trauma Alert: Trauma Acuity: Trauma Evaluation  Model of Arrival: Mode of Arrival: BLS via Trauma Squad Name and Number: Komal  Trauma Team: Current Providers  Attending Provider: Eduardo Renee DO  ED Technician: Slava Delatorre  ED Technician: Barbara Dhillon  Registered Nurse: Vidya Page RN  Consultants:     None      History of Present Illness     Chief Complaint:   Chief Complaint   Patient presents with    Fall     Patient brought by EMS for fall after standing witnessed after seizure at bar. No prior history of seizures. Positive head strike. Positive LOC x2 minutes. On aspirin. L hip replacement 3 weeks ago, right hip replacement 6 weeks ago. Patient denies pain/complaints.      HPI:  Nba Jimenez is a 54 y.o. male who presents via EMS after witnessed syncopal episode; patient was at a bar sitting on a stool.  Upon standing upright, he syncopized and fell to the ground.  Did strike his head.  This was witnessed by his sister who is a radiology technician in this facility.  She reported seizure-like activity of less than 1 minute with approximately 2-3-minute episode of confusion afterwards prior to return to normal mental status.  Patient himself really has no complaints upon arrival to the emergency department and is not especially concerned about the episode that he had today; he in fact has fairly frequent episodes of lightheadedness upon standing.  Only episode that resulted in syncope was in October 2023.  He had a quite extensive investigation for the etiology of his orthostatic lightheadedness with no specific etiology identified.  He had been drinking several alcoholic beverages today prior to the syncopal episode but has noted otherwise really had no complaints.  No recent illnesses or changes in health.  No recent changes in medications. Underwent L THR 8  July 2024 with no issues thus far in recovery. Takes baby aspirin daily.  He has not seen any melena or hematochezia over past several weeks.  No episodes of hematemesis.  States he aggressively tries to maintain adequate hydration and has no reason to think that he is dehydrated.    Trauma activation given syncopal episode/traumatic mechanism with hypotension on arrival.  It was notable immediately upon arrival the patient was significantly bradycardic although without the neurologic impairment that would be expected to accompany neurogenic shock. FAST exam demonstrated no cardiac or abdominal/pelvic etiology for hypotension.  Fluid bolus was administered as I did have much stronger suspicion of a cardiogenic or distributive cause for the hypotension.  0.5 mg atropine was administered with a slow/gradual increase in the heart rate of approximately 10 bpm to around 55-60 bpm.  Contemporaneous to this, BP did increase from initial hypotensive values to consistent MAP greater than 65, although this occurred in conjunction with IV fluid administration.  Briefly during this interval, patient developed a junctional rhythm with retrograde conduction at approximately 60 bpm which was faster than the sinus bradycardia he had been experiencing previously.  Subsequent to this, he transitioned to a sinus rhythm which he maintained consistently thereafter.  Patient maintained a consistently normal mental status throughout this entire interval and had no sx during any point of this. Plan was to obtain CT head/C-spine/chest on pelvis for any traumatic injuries and to perform a broad diagnostic workup concurrently for any etiologies that might account for the episode.  Assuming this did not show any traumatic findings, I had suspicion the patient might require pacer given the bradycardic episode.    CT imaging showed no traumatic findings.  He had a sustained and generally increased blood pressure compared to his baseline.  Broad  investigation of prior outpatient notes showed a baseline BP that was at times fairly low (90s/60s) at various times and largely consistent with a blood pressure as he demonstrated here.  Mild electrolyte disturbances were identified but nothing sufficient to account for the episode today. He had no further arrhythmias at any point and only brief episodes of sinus bradycardia to about 50 bpm not accompanied by hypotension.   I suspect ultimately that the symptoms today were indeed orthostatic in nature.    He very much preferred not to be admitted to the hospital given the episodes he had previously been of a very similar character to what he had today.  Stated that he would call his primary physician tomorrow for further investigation and that he has an appointment with his cardiologist in the near future.    At this point, the only finding that remained concerning was the brief episode of junctional rhythm.  Reviewed this at length with patient and his sister.  Offered option of an hospital admission.  Again, he very much prefered not to be admitted.  As he had demonstrated hemodynamic stability for a prolonged length of time and had been asymptomatic the entire time that he was in the ED no matter his vital signs, I did feel that discharge is reasonable.  He stated that he would contact his primary physician the following day and that he has an upcoming appoint with his cardiologist in the next several weeks.  Again, given his stability, all of this was reasonable.  He was discharged with very close return instructions regarding any further episodes of syncope or other concerning symptoms including anything that suggested ACS.  All questions were answered to satisfaction of patient and his sister prior to discharge.    Mechanism:Details of Incident: Patient fell after having 5 beers at bar. witnessed seizure like activity. no history. positive loc x 2 minutes. positive heads trike. on thinners Injury Date:  07/30/24 Injury Time: 1050 Injury Occurence Location - Specify County: [facility]      History provided by:  Medical records, patient, EMS personnel and relative (Patient's sister accompanies him)  Fall  Associated symptoms: no abdominal pain, no chest pain, no headaches, no nausea, no seizures and no vomiting      Review of Systems   Constitutional:  Negative for chills, diaphoresis, fatigue and fever.   Respiratory: Negative.  Negative for cough, chest tightness and shortness of breath.    Cardiovascular: Negative.  Negative for chest pain, palpitations and leg swelling.   Gastrointestinal: Negative.  Negative for abdominal pain, blood in stool, nausea and vomiting.   Genitourinary: Negative.    Musculoskeletal: Negative.    Skin: Negative.    Neurological:  Positive for syncope and light-headedness. Negative for dizziness, tremors, seizures, facial asymmetry, speech difficulty, weakness, numbness and headaches.   Hematological: Negative.        Historical Information     Immunizations:   There is no immunization history on file for this patient.    Past Medical History:   Diagnosis Date    Hypertension      History reviewed. No pertinent family history.  Past Surgical History:   Procedure Laterality Date    JOINT REPLACEMENT          E-Cigarette/Vaping     E-Cigarette/Vaping Substances       Family History: non-contributory    Meds/Allergies   None       No Known Allergies    PHYSICAL EXAM    Objective   Vitals:   First set: Temperature: (!) 97 °F (36.1 °C) (07/30/24 1105)  Pulse: (!) 45 (07/30/24 1105)  Respirations: 16 (07/30/24 1105)  Blood Pressure: 92/55 (07/30/24 1105)  SpO2: 97 % (07/30/24 1105)    Primary survey completed at time of initial evaluation:  Airway patent. Patient phonating normally with no stridor/dysphonia.  Lung sounds are present bilaterally with no wheeze/crackles/rhonchi.  Distal pulses are present in all extremities: 2+ radial/DP/PT. There is no visible external hemorrhage.  GCS 15. LUJAN  x4 with equal tone. Sensation intact in all extremities.  Exposure completed to assess for other occult injuries.    Secondary survey was completed for all other injuries.  Notable findings are reviewed in the full examination section.      Secondary Survey:   Physical Exam  Vitals and nursing note reviewed.   Constitutional:       General: He is awake. He is not in acute distress.     Appearance: Normal appearance. He is well-developed.   HENT:      Head: Normocephalic and atraumatic.      Right Ear: Hearing and external ear normal.      Left Ear: Hearing and external ear normal.   Neck:      Trachea: Trachea and phonation normal.      Comments: No posterior midline tenderness to palpation or step-off  Cardiovascular:      Rate and Rhythm: Regular rhythm. Bradycardia present.      Pulses:           Radial pulses are 2+ on the right side and 2+ on the left side.        Dorsalis pedis pulses are 2+ on the right side and 2+ on the left side.        Posterior tibial pulses are 2+ on the right side and 2+ on the left side.      Heart sounds: Normal heart sounds, S1 normal and S2 normal. No murmur heard.     No friction rub. No gallop.   Pulmonary:      Effort: Pulmonary effort is normal. No respiratory distress.      Breath sounds: Normal breath sounds. No stridor. No decreased breath sounds, wheezing, rhonchi or rales.   Abdominal:      General: There is no distension.      Palpations: There is no mass.      Tenderness: There is no abdominal tenderness. There is no guarding or rebound.   Musculoskeletal:      Comments: No posterior midline T/L spine ttp or stepoff   Skin:     General: Skin is warm and dry.   Neurological:      Mental Status: He is alert and oriented to person, place, and time.      GCS: GCS eye subscore is 4. GCS verbal subscore is 5. GCS motor subscore is 6.      Cranial Nerves: No cranial nerve deficit.      Sensory: No sensory deficit.      Motor: No abnormal muscle tone.      Comments: PERRLA;  EOMI. Sensation intact to light touch over face in V1-V3 distribution bilaterally. Facial expressions symmetric. Tongue/uvula midline. Shoulder shrug equal bilaterally. Strength 5/5 in UE/LE bilaterally. Sensation intact to light touch in UE/LE bilaterally.         Cervical spine cleared by clinical criteria? No (imaging required)      Invasive Devices       None                   Lab Results:   Results Reviewed       Procedure Component Value Units Date/Time    TSH [[id number]]  (Normal) Collected: 07/30/24 1142    Lab Status: Final result Specimen: Blood from Arm, Right Updated: 07/30/24 1354     TSH 3RD GENERATON 3.363 uIU/mL     T4, free [[id number]] Collected: 07/30/24 1142    Lab Status: In process Specimen: Blood Updated: 07/30/24 1321    UA w Reflex to Microscopic w Reflex to Culture [[id number]] Collected: 07/30/24 1311    Lab Status: Final result Specimen: Urine, Clean Catch Updated: 07/30/24 1316     Color, UA Light Yellow     Clarity, UA Clear     Specific Gravity, UA 1.015     pH, UA 5.5     Leukocytes, UA Negative     Nitrite, UA Negative     Protein, UA Negative mg/dl      Glucose, UA Negative mg/dl      Ketones, UA Negative mg/dl      Urobilinogen, UA <2.0 mg/dl      Bilirubin, UA Negative     Occult Blood, UA Negative    Ethanol [[id number]]  (Abnormal) Collected: 07/30/24 1142    Lab Status: Final result Specimen: Blood Updated: 07/30/24 1232     Ethanol Lvl 133 mg/dL     HS Troponin 0hr (reflex protocol) [[id number]]  (Normal) Collected: 07/30/24 1142    Lab Status: Final result Specimen: Blood from Arm, Right Updated: 07/30/24 1212     hs TnI 0hr 4 ng/L     Protime-INR [[id number]]  (Normal) Collected: 07/30/24 1142    Lab Status: Final result Specimen: Blood from Arm, Right Updated: 07/30/24 1211     Protime 14.2 seconds      INR 1.11    B-Type Natriuretic Peptide(BNP) [[id number]]  (Normal) Collected: 07/30/24 1142    Lab Status: Final result Specimen: Blood from Arm, Right Updated: 07/30/24 1211      BNP 93 pg/mL     Basic metabolic panel [[id number]]  (Abnormal) Collected: 07/30/24 1142    Lab Status: Final result Specimen: Blood from Arm, Right Updated: 07/30/24 1204     Sodium 131 mmol/L      Potassium 3.9 mmol/L      Chloride 99 mmol/L      CO2 20 mmol/L      ANION GAP 12 mmol/L      BUN 14 mg/dL      Creatinine 1.11 mg/dL      Glucose 113 mg/dL      Calcium 9.2 mg/dL      eGFR 74 ml/min/1.73sq m     Narrative:      National Kid[employer] Disease Foundation guidelines for Chronic Kidney Disease (CKD):     Stage 1 with normal or high GFR (GFR > 90 mL/min/1.73 square meters)    Stage 2 Mild CKD (GFR = 60-89 mL/min/1.73 square meters)    Stage 3A Moderate CKD (GFR = 45-59 mL/min/1.73 square meters)    Stage 3B Moderate CKD (GFR = 30-44 mL/min/1.73 square meters)    Stage 4 Severe CKD (GFR = 15-29 mL/min/1.73 square meters)    Stage 5 End Stage CKD (GFR <15 mL/min/1.73 square meters)  Note: GFR calculation is accurate only with a steady state creatinine    CK [[id number]]  (Normal) Collected: 07/30/24 1142    Lab Status: Final result Specimen: Blood from Arm, Right Updated: 07/30/24 1204     Total CK 91 U/L     Lactic acid, plasma (w/reflex if result > 2.0) [[id number]]  (Abnormal) Collected: 07/30/24 1142    Lab Status: Final result Specimen: Blood from Arm, Right Updated: 07/30/24 1203     LACTIC ACID 2.1 mmol/L     Narrative:      Result may be elevated if tourniquet was used during collection.    Blood gas, venous [[id number]]  (Abnormal) Collected: 07/30/24 1142    Lab Status: Final result Specimen: Blood from Arm, Right Updated: 07/30/24 1150     pH, Izaiah 7.305     pCO2, Izaiah 36.7 mm Hg      pO2, Izaiah 36.6 mm Hg      HCO3, Izaiah 17.9 mmol/L      Base Excess, Izaiah -7.8 mmol/L      O2 Content, Izaiha 9.0 ml/dL      O2 HGB, VENOUS 59.3 %     CBC and differential [[id number]]  (Abnormal) Collected: 07/30/24 1142    Lab Status: Final result Specimen: Blood from Arm, Right Updated: 07/30/24 1149     WBC 8.67 Thousand/uL      RBC  3.18 Million/uL      Hemoglobin 9.6 g/dL      Hematocrit 30.2 %      MCV 95 fL      MCH 30.2 pg      MCHC 31.8 g/dL      RDW 12.9 %      MPV 8.9 fL      Platelets 337 Thousands/uL      nRBC 0 /100 WBCs      Segmented % 59 %      Immature Grans % 0 %      Lymphocytes % 26 %      Monocytes % 8 %      Eosinophils Relative 6 %      Basophils Relative 1 %      Absolute Neutrophils 5.17 Thousands/µL      Absolute Immature Grans 0.03 Thousand/uL      Absolute Lymphocytes 2.25 Thousands/µL      Absolute Monocytes 0.66 Thousand/µL      Eosinophils Absolute 0.52 Thousand/µL      Basophils Absolute 0.04 Thousands/µL                    Imaging Studies:   Direct to CT: No  TRAUMA - CT chest abdomen pelvis w contrast   Final Result by Mayco Mclaughlin MD (07/30 1245)      No findings of acute traumatic injury in the chest, abdomen or pelvis.      Additional nonemergent/chronic findings as above.      The study was marked in EPIC for immediate notification.         Workstation performed: [id number]         TRAUMA - CT spine cervical wo contrast   Final Result by Mayco Mclaughlin MD (07/30 1224)      No cervical spine fracture or traumatic malalignment.      Postsurgical changes.         The study was marked in EPIC for immediate notification.            Workstation performed: [id number]         TRAUMA - CT head wo contrast   Final Result by Mayco Mclaughlin MD (07/30 1220)      No acute intracranial abnormality.            The study was marked in EPIC for immediate notification.      Workstation performed: [id number]         XR Trauma chest portable   Final Result by Wilmar Bob MD (07/30 1242)      No acute cardiopulmonary disease.            Workstation performed: [id number]               Procedures  ECG 12 Lead Documentation Only    Date/Time: 7/30/2024 11:33 AM    Performed by: Eduardo Renee DO  Authorized by: Eduardo Renee DO    Indications / Diagnosis:  Syncope  ECG reviewed by me, the ED Provider: yes     Patient location:  ED  Previous ECG:     Comparison to cardiac monitor: Yes    Interpretation:     Interpretation: abnormal    Rate:     ECG rate:  45    ECG rate assessment: bradycardic    Rhythm:     Rhythm: sinus bradycardia    Ectopy:     Ectopy: none    QRS:     QRS axis:  Normal    QRS intervals:  Normal  Conduction:     Conduction: normal    ST segments:     ST segments:  Normal  T waves:     T waves: normal    Comments:       QRS 88 QTc 375  ECG 12 Lead Documentation Only    Date/Time: 7/30/2024 11:36 AM    Performed by: Eduardo Renee DO  Authorized by: Eduardo Renee DO    Indications / Diagnosis:  Syncope: rhythm change  ECG reviewed by me, the ED Provider: yes    Patient location:  ED  Previous ECG:     Comparison to cardiac monitor: Yes    Interpretation:     Interpretation: abnormal    Rate:     ECG rate:  60    ECG rate assessment: normal    Rhythm:     Rhythm: junctional    Ectopy:     Ectopy: none    QRS:     QRS axis:  Normal    QRS intervals:  Normal  Conduction:     Conduction: normal    ST segments:     ST segments:  Normal  T waves:     T waves: normal    Comments:      QRS 86 QTc 410  Transitioned within approximately 1 minute to sinus rhythm  POC FAST US    Date/Time: 7/30/2024 11:18 AM    Performed by: Eduardo Renee DO  Authorized by: Eduardo Renee DO    Patient location:  ED  Procedure details:     Exam Type:  Diagnostic    Indications: blunt abdominal trauma, blunt chest trauma and hypotension      Assess for:  Hemothorax, intra-abdominal fluid and pericardial effusion    Technique: FAST      Views obtained:  Heart - Pericardial sac, LUQ - Splenorenal space, RUQ - Wolf's Pouch and Suprapubic - Pouch of Joseph    Image quality: diagnostic      Image availability:  Not obtained due to urgency  FAST Findings:     RUQ (Hepatorenal) free fluid: absent      LUQ (Splenorenal) free fluid: absent      Suprapubic free fluid: absent      Cardiac wall motion: identified       Pericardial effusion: absent    Interpretation:     Impressions: negative             ED Course  ED Course as of 07/30/24 1640   Tue Jul 30, 2024   1154 Blood gas, venous(!)   1154 CBC and differential(!)   1208 Basic metabolic panel(!)  Hyponatremia with decreased CO2   1208 Lactic acid, plasma (w/reflex if result > 2.0)(!)  Mildly elevated   1208 CK  Normal   1208 CT completed and awaiting interpretation: My review of CT, no ICH.  No displaced C-spine fracture.  No pneumothorax or effusions.  No intra-abdominal or intrapelvic free fluid.   1216 Protime-INR   1216 HS Troponin 0hr (reflex protocol)   1216 B-Type Natriuretic Peptide(BNP)   1223 TRAUMA - CT head wo contrast     FINDINGS:     PARENCHYMA:  No intracranial mass, mass effect or midline shift. No CT signs of acute infarction.  No acute parenchymal hemorrhage.     VENTRICLES AND EXTRA-AXIAL SPACES:  Normal for the patient's age.     VISUALIZED ORBITS:  Normal visualized orbits.     PARANASAL SINUSES:  Normal visualized paranasal sinuses.     CALVARIUM AND EXTRACRANIAL SOFT TISSUES:  Normal.     IMPRESSION:     No acute intracranial abnormality.        1238 Ethanol(!)   1238 TRAUMA - CT spine cervical wo contrast  FINDINGS:     ALIGNMENT:  There is straightening of normal cervical lordosis.  No subluxation or compression deformity.     VERTEBRAE: Status post anterior surgical fusion at 4 C5-6 and C6-7. No acute fracture.     DEGENERATIVE CHANGES:  Moderate multilevel cervical degenerative changes are noted. No critical central canal stenosis.     PREVERTEBRAL AND PARASPINAL SOFT TISSUES: Unremarkable     THORACIC INLET:  Normal.     IMPRESSION:     No cervical spine fracture or traumatic malalignment.     Postsurgical changes.        1252 TRAUMA - CT chest abdomen pelvis w contrast     IMPRESSION:     No findings of acute traumatic injury in the chest, abdomen or pelvis.     Additional nonemergent/chronic findings as above.     The study was marked in EPIC  for immediate notification.        1319 UA w Reflex to Microscopic w Reflex to Culture           Medical Decision Making  Amount and/or Complexity of Data Reviewed  Labs: ordered. Decision-making details documented in ED Course.  Radiology: ordered. Decision-making details documented in ED Course.    Risk  Prescription drug management.                Disposition  [facility] Transfer: No  Final diagnoses:   Orthostatic syncope   Hyponatremia   Junctional bradycardia     Time reflects when diagnosis was documented in both MDM as applicable and the Disposition within this note       Time User Action Codes Description Comment    7/30/2024  2:12 PM RitEduardo [email or web address] Ap Add [I95.1] Orthostatic syncope     7/30/2024  2:12 PM Ritz, Eduardo Ap Add [F10.929] Alcohol intoxication (HCC)     7/30/2024  2:13 PM Ritz, Eduardo Ap Remove [F10.929] Alcohol intoxication (HCC)     7/30/2024  2:13 PM Ritz, Eduardo Ap Add [E87.1] Hyponatremia     7/30/2024  2:13 PM Ritz, Eduardo Ap Add [R00.1] Junctional bradycardia           ED Disposition       ED Disposition   Discharge    Condition   Stable    Date/Time   Tue Jul 30, 2024  2:12 PM    Comment   Nba Jimenez discharge to home/self care.                   Follow-up Information    None       There are no discharge medications for this patient.    No discharge procedures on file.    PDMP Review       None            ED Provider  Electronically Signed by           Eduardo Renee DO  07/31/24 0704

## 2024-07-31 LAB
ATRIAL RATE: 45 BPM
ATRIAL RATE: 60 BPM
P AXIS: 55 DEGREES
PR INTERVAL: 186 MS
QRS AXIS: 62 DEGREES
QRS AXIS: 64 DEGREES
QRSD INTERVAL: 86 MS
QRSD INTERVAL: 88 MS
QT INTERVAL: 410 MS
QT INTERVAL: 434 MS
QTC INTERVAL: 375 MS
QTC INTERVAL: 410 MS
T WAVE AXIS: 26 DEGREES
T WAVE AXIS: 36 DEGREES
VENTRICULAR RATE: 45 BPM
VENTRICULAR RATE: 60 BPM

## 2024-07-31 PROCEDURE — 93010 ELECTROCARDIOGRAM REPORT: CPT | Performed by: INTERNAL MEDICINE
